# Patient Record
(demographics unavailable — no encounter records)

---

## 2024-10-21 NOTE — HISTORY OF PRESENT ILLNESS
[de-identified] : 68 year old female who presents for second opinion for a normocytic, normochromic anemia diagnosed in July of 2017 after it was she was noted to have a HgB of 9-10 gms by her other specialists. Hematology workup revealed a iron deficiency anemia secondary to blood loss and chronic use of proton pump inhibitors. She states that she has had history of gastritis her and her most recent endoscopy was possibly 4 years ago she is unable to discontinue proton pump inhibitors because she becomes symptomatic. She received 2 doses of injector for after consulting with hematology in July at that time her hemoglobin was found to be 11.1 hematocrit 34.5 and her ferritin was in the 30-50 range. Most recent CBC dated October 2017 demonstrates a hemoglobin of 12 and ferritin of 574. She states that although her energy is better, she continues to be fatigued has cold sensitivity sensitivity, hair loss and experiences bilateral medial thigh pain especially at night which is was not relieved with increased hydration or quinine. She denies melena, bright red blood per rectum,  or any other bleeding but states that she does note that she bruises easily. [de-identified] : Pt here for follow-up for iron deficiency. Patient states that she has been feeling cold and tired. Patient underwent surgery, DNC and carpel surgery within the last two months.  Denies any bleeding issues.

## 2024-10-21 NOTE — HISTORY OF PRESENT ILLNESS
[de-identified] : 68 year old female who presents for second opinion for a normocytic, normochromic anemia diagnosed in July of 2017 after it was she was noted to have a HgB of 9-10 gms by her other specialists. Hematology workup revealed a iron deficiency anemia secondary to blood loss and chronic use of proton pump inhibitors. She states that she has had history of gastritis her and her most recent endoscopy was possibly 4 years ago she is unable to discontinue proton pump inhibitors because she becomes symptomatic. She received 2 doses of injector for after consulting with hematology in July at that time her hemoglobin was found to be 11.1 hematocrit 34.5 and her ferritin was in the 30-50 range. Most recent CBC dated October 2017 demonstrates a hemoglobin of 12 and ferritin of 574. She states that although her energy is better, she continues to be fatigued has cold sensitivity sensitivity, hair loss and experiences bilateral medial thigh pain especially at night which is was not relieved with increased hydration or quinine. She denies melena, bright red blood per rectum,  or any other bleeding but states that she does note that she bruises easily. [de-identified] : Pt here for follow-up for iron deficiency. Patient states that she has been feeling cold and tired. Patient underwent surgery, DNC and carpel surgery within the last two months.  Denies any bleeding issues.

## 2024-10-21 NOTE — CONSULT LETTER
[Dear  ___] : Dear  [unfilled], [Consult Letter:] : I had the pleasure of evaluating your patient, [unfilled]. [Please see my note below.] : Please see my note below. [Consult Closing:] : Thank you very much for allowing me to participate in the care of this patient.  If you have any questions, please do not hesitate to contact me. [Sincerely,] : Sincerely, [FreeTextEntry3] : Macey Briseno MD\par  [facility] [place] at [facility]\par

## 2024-10-21 NOTE — DISCUSSION/SUMMARY
[FreeTextEntry1] : Discussed MRI findings with patient- needs follow up scan in 3 months- will order at her next apt

## 2024-10-21 NOTE — REVIEW OF SYSTEMS
[Fatigue] : fatigue [Joint Pain] : joint pain [Joint Stiffness] : joint stiffness [Negative] : Allergic/Immunologic [Shortness Of Breath] : no shortness of breath [Easy Bruising] : no tendency for easy bruising [FreeTextEntry7] : hiatal hernia [FreeTextEntry9] : Bilateral inner thigh pain. [de-identified] : Losing hair.

## 2024-10-21 NOTE — HISTORY OF PRESENT ILLNESS
[de-identified] : 68 year old female who presents for second opinion for a normocytic, normochromic anemia diagnosed in July of 2017 after it was she was noted to have a HgB of 9-10 gms by her other specialists. Hematology workup revealed a iron deficiency anemia secondary to blood loss and chronic use of proton pump inhibitors. She states that she has had history of gastritis her and her most recent endoscopy was possibly 4 years ago she is unable to discontinue proton pump inhibitors because she becomes symptomatic. She received 2 doses of injector for after consulting with hematology in July at that time her hemoglobin was found to be 11.1 hematocrit 34.5 and her ferritin was in the 30-50 range. Most recent CBC dated October 2017 demonstrates a hemoglobin of 12 and ferritin of 574. She states that although her energy is better, she continues to be fatigued has cold sensitivity sensitivity, hair loss and experiences bilateral medial thigh pain especially at night which is was not relieved with increased hydration or quinine. She denies melena, bright red blood per rectum,  or any other bleeding but states that she does note that she bruises easily. [de-identified] : Pt here for follow-up for iron deficiency. Patient states that she has been feeling cold and tired. Patient underwent surgery, DNC and carpel surgery within the last two months.  Denies any bleeding issues.

## 2024-10-21 NOTE — ASSESSMENT
[FreeTextEntry1] : Iron deficiency anemia  Patient's GERD better, on PPI, suspected Crohns on endoscopy   - reviewed with patient iron physiology - absorption, loss, balance- presentation of deficiency - secondary to gastritis, use of PPI and possible loss with diverticulosis and newly diagnosed Crohns  vs ulcers from NSAID that she takes for left knee pain - requires IV iron intermittently- due to blood loss from colitis and malabsorption with PPI - repeat cherelle level last 145, no overt bleeding - right knee OA - ortho evaluation - needs left knee replacement, delayed b/o Covid   - HTN- elevated when she comes to office, normal at home   Patient fatigued Hgb stable 11.4 Diverticulitis flare- CT scan 6/2022- showed stomach wall thickening- no contrast done as patient had poor IV access- has EGD scheduled in July  Weight loss- however actively trying with  and decreased oral intake  Denies bleeding  Patient on ASA- bruises easily  Ferritin 50-repeat today  increased CRP  check CEA s/p pancreatitis- she is due for follow up imaging - ordered by PCP    Left LE - nodule scheduled for CT scan- benign on imagining.   Return in 1 yr- reg labs, irons

## 2024-10-21 NOTE — CONSULT LETTER
[Dear  ___] : Dear  [unfilled], [Consult Letter:] : I had the pleasure of evaluating your patient, [unfilled]. [Please see my note below.] : Please see my note below. [Consult Closing:] : Thank you very much for allowing me to participate in the care of this patient.  If you have any questions, please do not hesitate to contact me. [Sincerely,] : Sincerely, [FreeTextEntry3] : Macey Briseno MD\par  [facility] [place] at [facility]\par

## 2024-10-21 NOTE — BEGINNING OF VISIT
[0] : 2) Feeling down, depressed, or hopeless: Not at all (0) [PHQ-2 Negative] : PHQ-2 Negative [[id number]] : 0 [Pain Scale: ___] : On a scale of 1-10, today the patient's pain is a(n) [unfilled]. [Never] : Never [Date Discussed (MM/DD/YY): ___] : Discussed: [unfilled] [Reviewed, no changes] : Reviewed, no changes

## 2024-10-21 NOTE — REVIEW OF SYSTEMS
[Fatigue] : fatigue [Joint Pain] : joint pain [Joint Stiffness] : joint stiffness [Negative] : Allergic/Immunologic [Shortness Of Breath] : no shortness of breath [Easy Bruising] : no tendency for easy bruising [FreeTextEntry7] : hiatal hernia [FreeTextEntry9] : Bilateral inner thigh pain. [de-identified] : Losing hair.

## 2024-10-21 NOTE — BEGINNING OF VISIT
[0] : 2) Feeling down, depressed, or hopeless: Not at all (0) [PHQ-2 Negative] : PHQ-2 Negative [[id number]] : 0 [Pain Scale: ___] : On a scale of 1-10, today the patient's pain is a(n) [unfilled]. [Never] : Never [Date Discussed (MM/DD/YY): ___] : Discussed: [unfilled] [Reviewed, no changes] : Reviewed, no changes

## 2024-10-21 NOTE — HISTORY OF PRESENT ILLNESS
[de-identified] : 68 year old female who presents for second opinion for a normocytic, normochromic anemia diagnosed in July of 2017 after it was she was noted to have a HgB of 9-10 gms by her other specialists. Hematology workup revealed a iron deficiency anemia secondary to blood loss and chronic use of proton pump inhibitors. She states that she has had history of gastritis her and her most recent endoscopy was possibly 4 years ago she is unable to discontinue proton pump inhibitors because she becomes symptomatic. She received 2 doses of injector for after consulting with hematology in July at that time her hemoglobin was found to be 11.1 hematocrit 34.5 and her ferritin was in the 30-50 range. Most recent CBC dated October 2017 demonstrates a hemoglobin of 12 and ferritin of 574. She states that although her energy is better, she continues to be fatigued has cold sensitivity sensitivity, hair loss and experiences bilateral medial thigh pain especially at night which is was not relieved with increased hydration or quinine. She denies melena, bright red blood per rectum,  or any other bleeding but states that she does note that she bruises easily. [de-identified] : Pt here for follow-up for iron deficiency. Patient states that she has been feeling cold and tired. Patient underwent surgery, DNC and carpel surgery within the last two months.  Denies any bleeding issues.

## 2024-10-21 NOTE — REVIEW OF SYSTEMS
[Fatigue] : fatigue [Joint Pain] : joint pain [Joint Stiffness] : joint stiffness [Negative] : Allergic/Immunologic [Shortness Of Breath] : no shortness of breath [Easy Bruising] : no tendency for easy bruising [FreeTextEntry7] : hiatal hernia [FreeTextEntry9] : Bilateral inner thigh pain. [de-identified] : Losing hair.

## 2024-10-21 NOTE — REVIEW OF SYSTEMS
[Fatigue] : fatigue [Joint Pain] : joint pain [Joint Stiffness] : joint stiffness [Negative] : Allergic/Immunologic [Shortness Of Breath] : no shortness of breath [Easy Bruising] : no tendency for easy bruising [FreeTextEntry7] : hiatal hernia [FreeTextEntry9] : Bilateral inner thigh pain. [de-identified] : Losing hair.

## 2024-10-21 NOTE — BEGINNING OF VISIT
[0] : 2) Feeling down, depressed, or hopeless: Not at all (0) [PHQ-2 Negative] : PHQ-2 Negative [[id number]] : 0 [Pain Scale: ___] : On a scale of 1-10, today the patient's pain is a(n) [unfilled]. [Never] : Never [Date Discussed (MM/DD/YY): ___] : Discussed: [unfilled] [Reviewed, no changes] : Reviewed, no changes

## 2024-10-21 NOTE — CONSULT LETTER
[Dear  ___] : Dear  [unfilled], [Consult Letter:] : I had the pleasure of evaluating your patient, [unfilled]. [Please see my note below.] : Please see my note below. [Consult Closing:] : Thank you very much for allowing me to participate in the care of this patient.  If you have any questions, please do not hesitate to contact me. [Sincerely,] : Sincerely, [FreeTextEntry3] : Macey Briseno MD\par  [facility] [place] at [facility]\par

## 2024-10-21 NOTE — CONSULT LETTER
[Dear  ___] : Dear  [unfilled], [Consult Letter:] : I had the pleasure of evaluating your patient, [unfilled]. [Please see my note below.] : Please see my note below. [Consult Closing:] : Thank you very much for allowing me to participate in the care of this patient.  If you have any questions, please do not hesitate to contact me. [Sincerely,] : Sincerely, [FreeTextEntry3] : Macey Briseno MD\par  [facility] [place] at [facility]\par

## 2024-10-21 NOTE — BEGINNING OF VISIT
[0] : 2) Feeling down, depressed, or hopeless: Not at all (0) [PHQ-2 Negative] : PHQ-2 Negative [[id number]] : 0 [Pain Scale: ___] : On a scale of 1-10, today the patient's pain is a(n) [unfilled]. [Never] : Never [Date Discussed (MM/DD/YY): ___] : Discussed: [unfilled] [Reviewed, no changes] : Reviewed, no changes